# Patient Record
Sex: MALE | Race: WHITE | NOT HISPANIC OR LATINO | Employment: OTHER | URBAN - NONMETROPOLITAN AREA
[De-identification: names, ages, dates, MRNs, and addresses within clinical notes are randomized per-mention and may not be internally consistent; named-entity substitution may affect disease eponyms.]

---

## 2017-01-01 ENCOUNTER — OFFICE VISIT (OUTPATIENT)
Dept: CARDIOLOGY | Facility: CLINIC | Age: 82
End: 2017-01-01

## 2017-01-01 ENCOUNTER — OUTSIDE FACILITY SERVICE (OUTPATIENT)
Dept: CARDIOLOGY | Facility: CLINIC | Age: 82
End: 2017-01-01

## 2017-01-01 ENCOUNTER — TELEPHONE (OUTPATIENT)
Dept: CARDIOLOGY | Facility: CLINIC | Age: 82
End: 2017-01-01

## 2017-01-01 VITALS
WEIGHT: 166 LBS | HEIGHT: 71 IN | SYSTOLIC BLOOD PRESSURE: 98 MMHG | HEART RATE: 64 BPM | BODY MASS INDEX: 23.24 KG/M2 | DIASTOLIC BLOOD PRESSURE: 50 MMHG

## 2017-01-01 DIAGNOSIS — I10 ESSENTIAL HYPERTENSION: ICD-10-CM

## 2017-01-01 DIAGNOSIS — I25.9 IHD (ISCHEMIC HEART DISEASE): ICD-10-CM

## 2017-01-01 DIAGNOSIS — R06.02 SHORTNESS OF BREATH: ICD-10-CM

## 2017-01-01 DIAGNOSIS — E78.00 HYPERCHOLESTEREMIA: ICD-10-CM

## 2017-01-01 DIAGNOSIS — I73.9 PAD (PERIPHERAL ARTERY DISEASE) (HCC): ICD-10-CM

## 2017-01-01 DIAGNOSIS — R01.1 MURMUR, CARDIAC: ICD-10-CM

## 2017-01-01 DIAGNOSIS — I48.0 PAF (PAROXYSMAL ATRIAL FIBRILLATION) (HCC): Primary | ICD-10-CM

## 2017-01-01 PROCEDURE — 93306 TTE W/DOPPLER COMPLETE: CPT | Performed by: INTERNAL MEDICINE

## 2017-01-01 PROCEDURE — 99213 OFFICE O/P EST LOW 20 MIN: CPT | Performed by: INTERNAL MEDICINE

## 2017-01-01 PROCEDURE — 93000 ELECTROCARDIOGRAM COMPLETE: CPT | Performed by: INTERNAL MEDICINE

## 2017-01-01 RX ORDER — MAGNESIUM OXIDE 400 MG/1
TABLET ORAL
Qty: 60 TABLET | Refills: 11 | OUTPATIENT
Start: 2017-01-01

## 2017-01-01 RX ORDER — FOLIC ACID 1 MG/1
1 TABLET ORAL DAILY
COMMUNITY

## 2017-01-01 RX ORDER — AMLODIPINE BESYLATE 5 MG/1
5 TABLET ORAL DAILY
COMMUNITY

## 2017-01-01 RX ORDER — SOTALOL HYDROCHLORIDE 80 MG/1
TABLET ORAL
Qty: 90 TABLET | Refills: 11 | Status: SHIPPED | OUTPATIENT
Start: 2017-01-01

## 2017-01-01 RX ORDER — LORATADINE 10 MG/1
CAPSULE, LIQUID FILLED ORAL 2 TIMES DAILY
COMMUNITY

## 2017-04-20 PROBLEM — I25.9 IHD (ISCHEMIC HEART DISEASE): Status: ACTIVE | Noted: 2017-01-01

## 2017-04-20 PROBLEM — I10 ESSENTIAL HYPERTENSION: Status: ACTIVE | Noted: 2017-01-01

## 2017-04-20 PROBLEM — I73.9 PAD (PERIPHERAL ARTERY DISEASE) (HCC): Status: ACTIVE | Noted: 2017-01-01

## 2017-04-20 PROBLEM — R06.02 SHORTNESS OF BREATH: Status: ACTIVE | Noted: 2017-01-01

## 2017-04-20 PROBLEM — I48.0 PAF (PAROXYSMAL ATRIAL FIBRILLATION) (HCC): Status: ACTIVE | Noted: 2017-01-01

## 2017-04-20 PROBLEM — R01.1 MURMUR, CARDIAC: Status: ACTIVE | Noted: 2017-01-01

## 2017-04-20 PROBLEM — E78.00 HYPERCHOLESTEREMIA: Status: ACTIVE | Noted: 2017-01-01

## 2017-04-20 NOTE — PROGRESS NOTES
Chief Complaint   Patient presents with   • Follow-up     Denies chest pain or palpitations. PCP refills meds. Labs per PCP recently.    • Shortness of Breath     Is progressively getting worse. Uses 3L when he is out moving around but uses 2L continuous at home. Sees Dr. Paiz. Is using albuterol neb about every 2 1/2 hrs.        CARDIAC COMPLAINTS  dyspnea        Subjective   Roger Torres is a 89 y.o. male came in today for his follow-up visit.  He has history of ischemic heart disease diagnosed in 2010 when he underwent stenting and again in 2011.  He also has peripheral vascular disease for which he underwent femorofemoral bypass in 2005.  He came today stating that is being having more shortness of breath.  Uses oxygen 24 7.  His last cardiac workup in 2015 showed mild pulmonary hypertension and normal LV systolic function.  His lab work is essentially followed at your office.  He denies having any chest pain.              Cardiac History  Past Surgical History:   Procedure Laterality Date   • CARDIAC CATHETERIZATION  09/28/2010    70%  RCA-3.5 x 23mm vision stent   • CARDIAC CATHETERIZATION  07/22/2011    90% RCA- 3.0x18, 3.25x 28 & 3.5x 28 Promus.   • CARDIOVASCULAR STRESS TEST  02/17/2005    D. Myoview-53% THR. Inferior infarct.   • CARDIOVASCULAR STRESS TEST  01/02/2012    ([facility]) Negative   • ECHO - CONVERTED  10/15/2007    EF 60%   • ECHO - CONVERTED  08/02/2010    EF>60%. RVSP-36 mmHg   • ECHO - CONVERTED  02/23/2011    (Mod) 7 min, 82% THR. Inferior Ischemia.   • ECHO - CONVERTED  07/22/2011    EF>60%   • ECHO - CONVERTED  09/23/2015    EF 60-65%, mild MR,borderline pulm HTN   • FEMORAL FEMORAL BYPASS  04/01/2005    Left fem- pop bypass-Dr Vela   • OTHER SURGICAL HISTORY  10/10/2008    CTA neck-60% (R) ICA and 40%(L) ICA   • OTHER SURGICAL HISTORY  03/02/2011    PAM- right 0.89, left 0.87, mildly depressed   • SUBCLAVIAN BYPASS  2001       Current Outpatient Prescriptions   Medication Sig Dispense  Refill   • albuterol (PROVENTIL) (2.5 MG/3ML) 0.083% nebulizer solution Take 2.5 mg by nebulization. Uses about every 2 1/2 hours     • alfuzosin (UROXATRAL) 10 MG 24 hr tablet Take 10 mg by mouth Daily.     • amLODIPine (NORVASC) 5 MG tablet Take 5 mg by mouth Daily.     • aspirin 81 MG EC tablet Take 81 mg by mouth Daily.     • atorvastatin (LIPITOR) 10 MG tablet 1/2 tab at bedtime     • clopidogrel (PLAVIX) 75 MG tablet Take 75 mg by mouth Daily.     • docusate sodium (COLACE) 100 MG capsule Take 100 mg by mouth 2 (Two) Times a Day.     • finasteride (PROSCAR) 5 MG tablet Take 5 mg by mouth Daily.     • folic acid (FOLVITE) 1 MG tablet Take 1 mg by mouth Daily.     • isosorbide mononitrate (IMDUR) 60 MG 24 hr tablet Take 60 mg by mouth Daily.     • Loratadine 10 MG capsule Take  by mouth 2 (Two) Times a Day.     • LORazepam (ATIVAN) 1 MG tablet Take 1 mg by mouth Every Night.     • Magnesium Oxide 400 (240 MG) MG tablet Twice a day 60 tablet 6   • methotrexate 2.5 MG tablet Take  by mouth. On Monday and Tuesday only- unsure of how many tablets     • Multiple Vitamins-Minerals (CENTRUM SILVER PO) Take  by mouth.     • pantoprazole (PROTONIX) 40 MG EC tablet Take 40 mg by mouth Daily.     • sotalol (BETAPACE) 80 MG tablet TAKE 1 & 1/2 TABLET TWICE DAILY. 90 tablet 11   • terazosin (HYTRIN) 5 MG capsule 2 tabs at bedtime       No current facility-administered medications for this visit.        Allergies:  Review of patient's allergies indicates no known allergies.      Past Medical History:   Diagnosis Date   • Carotid arterial disease     nadya, medical mgmt   • Cataract     surgery   • History of back surgery    • History of surgery     leg surgery, vascular secondary to blockage   • Hx of tonsillectomy    • Hypercholesterolemia    • Hyperlipidemia    • Hypertension    • Mitral regurgitation     mild   • PAF (paroxysmal atrial fibrillation)    • PVD (peripheral vascular disease)        Social History     Social  "History   • Marital status:      Spouse name: N/A   • Number of children: N/A   • Years of education: N/A     Occupational History   • Not on file.     Social History Main Topics   • Smoking status: Former Smoker   • Smokeless tobacco: Never Used   • Alcohol use No   • Drug use: No   • Sexual activity: Not on file     Other Topics Concern   • Not on file     Social History Narrative       Family History   Problem Relation Age of Onset   • Stroke Mother    • Diabetes Mother    • Stroke Other        Review of Systems   Constitutional: Positive for fatigue. Negative for activity change.   HENT: Negative for congestion.    Eyes: Negative for discharge.   Respiratory: Positive for shortness of breath. Negative for chest tightness.    Cardiovascular: Positive for leg swelling. Negative for chest pain.   Gastrointestinal: Negative for abdominal distention.   Endocrine: Negative for cold intolerance.   Genitourinary: Negative for difficulty urinating.   Musculoskeletal: Negative for arthralgias.   Skin: Negative for color change.   Allergic/Immunologic: Negative for environmental allergies.   Neurological: Positive for dizziness.   Hematological: Negative for adenopathy.   Psychiatric/Behavioral: Negative for agitation.       Diabetes- No  Thyroid- normal    Objective     BP 98/50  Pulse 64  Ht 71\" (180.3 cm)  Wt 166 lb (75.3 kg)  BMI 23.15 kg/m2    Physical Exam   Constitutional: He appears well-developed.   HENT:   Head: Normocephalic.   Eyes: Pupils are equal, round, and reactive to light.   Neck: Normal range of motion.   Cardiovascular: Normal rate.    Murmur heard.  Pulmonary/Chest: Effort normal.   Abdominal: Soft.   Musculoskeletal: Normal range of motion.   Neurological: He is alert.   Skin: Skin is warm.   Psychiatric: He has a normal mood and affect.         ECG 12 Lead  Date/Time: 4/20/2017 5:33 PM  Performed by: MARLI HERNANDEZ  Authorized by: MARLI HERNANDEZ   Comparison: compared with " previous ECG from 10/13/2016  Similar to previous ECG  Rhythm: sinus rhythm  Rate: normal  QRS axis: normal  Q waves: V3 and V4  Clinical impression: abnormal ECG                  Assessment/Plan   His heart rate is stable.  His blood pressure is lower limit of normal.  He does appears to be mildly pale.  He is advised to talk you regarding his hemoglobin level.  EKG shows normal sinus rhythm with normal FL interval QTC interval.  I explained to him about his echo findings.  I think most of his problem is appears to be from his lungs.  If he is concerned about his cardiac status, we can do an echocardiogram.  He had this time was to try continuing his medication itself.  I will really appreciate, if you can send me copy of his labs.  Roger was seen today for follow-up and shortness of breath.    Diagnoses and all orders for this visit:    PAF (paroxysmal atrial fibrillation)    IHD (ischemic heart disease)    Essential hypertension    Hypercholesteremia    Shortness of breath    Murmur, cardiac                         Electronically signed by Naima Valencia MD April 20, 2017 5:29 PM